# Patient Record
Sex: FEMALE | Race: BLACK OR AFRICAN AMERICAN
[De-identification: names, ages, dates, MRNs, and addresses within clinical notes are randomized per-mention and may not be internally consistent; named-entity substitution may affect disease eponyms.]

---

## 2018-01-08 ENCOUNTER — HOSPITAL ENCOUNTER (INPATIENT)
Dept: HOSPITAL 72 - EMR | Age: 78
LOS: 2 days | Discharge: NURSING, REHAB OR LONG-TERM CARE | DRG: 291 | End: 2018-01-10
Payer: COMMERCIAL

## 2018-01-08 VITALS — WEIGHT: 150 LBS | BODY MASS INDEX: 25.61 KG/M2 | HEIGHT: 64 IN

## 2018-01-08 VITALS — DIASTOLIC BLOOD PRESSURE: 61 MMHG | SYSTOLIC BLOOD PRESSURE: 153 MMHG

## 2018-01-08 VITALS — DIASTOLIC BLOOD PRESSURE: 66 MMHG | SYSTOLIC BLOOD PRESSURE: 138 MMHG

## 2018-01-08 VITALS — SYSTOLIC BLOOD PRESSURE: 143 MMHG | DIASTOLIC BLOOD PRESSURE: 51 MMHG

## 2018-01-08 DIAGNOSIS — E87.5: ICD-10-CM

## 2018-01-08 DIAGNOSIS — J96.00: ICD-10-CM

## 2018-01-08 DIAGNOSIS — E87.70: ICD-10-CM

## 2018-01-08 DIAGNOSIS — Z43.1: ICD-10-CM

## 2018-01-08 DIAGNOSIS — N18.6: ICD-10-CM

## 2018-01-08 DIAGNOSIS — D63.1: ICD-10-CM

## 2018-01-08 DIAGNOSIS — I13.2: Primary | ICD-10-CM

## 2018-01-08 DIAGNOSIS — R13.10: ICD-10-CM

## 2018-01-08 DIAGNOSIS — F03.90: ICD-10-CM

## 2018-01-08 DIAGNOSIS — Z66: ICD-10-CM

## 2018-01-08 LAB
ADD MANUAL DIFF: YES
ALBUMIN SERPL-MCNC: 3.9 G/DL (ref 3.4–5)
ALBUMIN/GLOB SERPL: 0.8 {RATIO} (ref 1–2.7)
ALP SERPL-CCNC: 162 U/L (ref 46–116)
ALT SERPL-CCNC: 80 U/L (ref 12–78)
ANION GAP SERPL CALC-SCNC: 17 MMOL/L (ref 5–15)
APPEARANCE UR: (no result)
APTT PPP: (no result) S
AST SERPL-CCNC: 41 U/L (ref 15–37)
BILIRUB SERPL-MCNC: 0.5 MG/DL (ref 0.2–1)
BUN SERPL-MCNC: 173 MG/DL (ref 7–18)
CALCIUM SERPL-MCNC: 8 MG/DL (ref 8.5–10.1)
CHLORIDE SERPL-SCNC: 95 MMOL/L (ref 98–107)
CK MB SERPL-MCNC: 6.5 NG/ML (ref 0–3.6)
CK SERPL-CCNC: 848 U/L (ref 26–308)
CO2 SERPL-SCNC: 20 MMOL/L (ref 21–32)
CREAT SERPL-MCNC: 6.8 MG/DL (ref 0.55–1.3)
ERYTHROCYTE [DISTWIDTH] IN BLOOD BY AUTOMATED COUNT: 16.6 % (ref 11.6–14.8)
GLOBULIN SER-MCNC: 5.1 G/DL
GLUCOSE UR STRIP-MCNC: NEGATIVE MG/DL
HCT VFR BLD CALC: 16.7 % (ref 37–47)
HGB BLD-MCNC: 5.6 G/DL (ref 12–16)
KETONES UR QL STRIP: NEGATIVE
LEUKOCYTE ESTERASE UR QL STRIP: (no result)
MCV RBC AUTO: 104 FL (ref 80–99)
NITRITE UR QL STRIP: NEGATIVE
PH UR STRIP: 5 [PH] (ref 4.5–8)
PLATELET # BLD: 232 K/UL (ref 150–450)
POTASSIUM SERPL-SCNC: 7 MMOL/L (ref 3.5–5.1)
PROT UR QL STRIP: (no result)
RBC # BLD AUTO: 1.62 M/UL (ref 4.2–5.4)
SODIUM SERPL-SCNC: 132 MMOL/L (ref 136–145)
SP GR UR STRIP: 1.01 (ref 1–1.03)
UROBILINOGEN UR-MCNC: NORMAL MG/DL (ref 0–1)
WBC # BLD AUTO: 12.1 K/UL (ref 4.8–10.8)

## 2018-01-08 PROCEDURE — 94664 DEMO&/EVAL PT USE INHALER: CPT

## 2018-01-08 PROCEDURE — 86710 INFLUENZA VIRUS ANTIBODY: CPT

## 2018-01-08 PROCEDURE — 82962 GLUCOSE BLOOD TEST: CPT

## 2018-01-08 PROCEDURE — 99285 EMERGENCY DEPT VISIT HI MDM: CPT

## 2018-01-08 PROCEDURE — 71045 X-RAY EXAM CHEST 1 VIEW: CPT

## 2018-01-08 PROCEDURE — 93005 ELECTROCARDIOGRAM TRACING: CPT

## 2018-01-08 PROCEDURE — 87081 CULTURE SCREEN ONLY: CPT

## 2018-01-08 PROCEDURE — 36415 COLL VENOUS BLD VENIPUNCTURE: CPT

## 2018-01-08 PROCEDURE — 82553 CREATINE MB FRACTION: CPT

## 2018-01-08 PROCEDURE — 87181 SC STD AGAR DILUTION PER AGT: CPT

## 2018-01-08 PROCEDURE — 85007 BL SMEAR W/DIFF WBC COUNT: CPT

## 2018-01-08 PROCEDURE — 82550 ASSAY OF CK (CPK): CPT

## 2018-01-08 PROCEDURE — 81003 URINALYSIS AUTO W/O SCOPE: CPT

## 2018-01-08 PROCEDURE — 85025 COMPLETE CBC W/AUTO DIFF WBC: CPT

## 2018-01-08 PROCEDURE — 83605 ASSAY OF LACTIC ACID: CPT

## 2018-01-08 PROCEDURE — 87086 URINE CULTURE/COLONY COUNT: CPT

## 2018-01-08 PROCEDURE — 83880 ASSAY OF NATRIURETIC PEPTIDE: CPT

## 2018-01-08 PROCEDURE — 84484 ASSAY OF TROPONIN QUANT: CPT

## 2018-01-08 PROCEDURE — 94760 N-INVAS EAR/PLS OXIMETRY 1: CPT

## 2018-01-08 PROCEDURE — 80053 COMPREHEN METABOLIC PANEL: CPT

## 2018-01-08 NOTE — DIAGNOSTIC IMAGING REPORT
Indication: Dyspnea

 

Comparison:  None

 

A single view chest radiograph was obtained.

 

Findings:

 

There is a focus of airspace disease within the right lung. Findings suspicious for

pneumonia although CHF is the differential. The heart is enlarged. The bones are

osteopenic. Gastrostomy noted.

 

IMPRESSION:

 

Congestive heart failure asymmetric to the right lung versus infiltrate. Please

correlate clinically

## 2018-01-08 NOTE — HISTORY AND PHYSICAL REPORT
DATE OF ADMISSION:  01/08/2018



CHIEF COMPLAINT AND REASON FOR HOSPITALIZATION:  The patient is admitted

with failure to thrive and shortness of breath.



HISTORY OF PRESENT ILLNESS:  The patient comes in from a skilled nursing

facility with POLST for No CPR and No Transfer To [facility], but

I called the facility.  Apparently, she has not yet been a member of

hospice and she presents with some respiratory failure.  She was put on

BiPAP in the emergency room.  She has chronic kidney disease and anemia

and is a [employer] and does not allow transfusions.  The patient

has severe dementia, gastrostomy feedings, and chronic kidney failure.

She now has hyperkalemia, severe anemia, and shortness of breath.  The

patient is unable to provide any further history.



PHYSICAL EXAMINATION:

GENERAL:  The patient is seen in the emergency room.  She is on BiPAP.  She

has gastrostomy feeding tube.

HEAD, EYES, EARS, NOSE, THROAT:  Sclerae are nonicteric.  She looks _____

and not following commands.  Oral mucosa moist.

NECK:  No adenopathy.

LUNGS:  Bilateral rhonchi.

HEART:  Regular rhythm.

ABDOMEN:  Soft without organomegaly.  Gastrostomy tube is in place.

EXTREMITIES:  Show severe muscle wasting.  No edema, cyanosis, or

clubbing.

NEUROLOGIC:  The patient is confused, disoriented, not following commands.

She does have mild movement in all extremities.



IMPRESSION:

1. Congestive heart failure, acute and chronic.

2. End-stage renal disease.

3. Severe anemia secondary to end-stage renal disease.

4. Directives for Do Not Resuscitate and comfort measures only.



PLAN:  We will initiate comfort measures and try to reach the family.  A

 and ECF to make a plan.  At this time, I would minimize

laboratories and any aggressive therapy with comfort care focus.  Her

records from the F have been reviewed.









  ______________________________________________

  Daniel Rivas M.D.





DR:  DERRICK

D:  01/08/2018 07:49

T:  01/08/2018 17:44

JOB#:  [id number]

CC:

## 2018-01-08 NOTE — CARDIOLOGY REPORT
--------------- APPROVED REPORT --------------





EKG Measurement

Heart [id number]

ND 

[id number]

[id number]

[id number]

[id number]





Normal sinus rhythm with sinus arrhythmia

Nonspecific ST and T wave abnormality

Prolonged QT

Abnormal ECG

## 2018-01-09 VITALS — DIASTOLIC BLOOD PRESSURE: 79 MMHG | SYSTOLIC BLOOD PRESSURE: 152 MMHG

## 2018-01-09 VITALS — SYSTOLIC BLOOD PRESSURE: 155 MMHG | DIASTOLIC BLOOD PRESSURE: 90 MMHG

## 2018-01-09 VITALS — DIASTOLIC BLOOD PRESSURE: 76 MMHG | SYSTOLIC BLOOD PRESSURE: 163 MMHG

## 2018-01-09 VITALS — SYSTOLIC BLOOD PRESSURE: 147 MMHG | DIASTOLIC BLOOD PRESSURE: 66 MMHG

## 2018-01-09 VITALS — SYSTOLIC BLOOD PRESSURE: 131 MMHG | DIASTOLIC BLOOD PRESSURE: 79 MMHG

## 2018-01-09 NOTE — GENERAL PROGRESS NOTE
Assessment/Plan


Problem List:  


(1) CHF (congestive heart failure)


ICD Codes:  I50.9 - Heart failure, unspecified


SNOMED:  71514560


(2) End-stage renal disease


ICD Codes:  N18.6 - End stage renal disease


SNOMED:  34358087


(3) Respiratory distress


ICD Codes:  R06.03 - Acute respiratory distress


SNOMED:  953159292


(4) Fluid overload


ICD Codes:  E87.70 - Fluid overload, unspecified


SNOMED:  01939473


(5) Hyperkalemia


ICD Codes:  E87.5 - Hyperkalemia


SNOMED:  64365258


(6) Respiratory failure


ICD Codes:  J96.90 - Respiratory failure, unspecified, unspecified whether with 

hypoxia or hypercapnia


SNOMED:  740151948


(7) Anemia


ICD Codes:  D64.9 - Anemia, unspecified


SNOMED:  236509052


Qualifiers:  


   


Assessment/Plan


hospice, comfort





Subjective


ROS Limited/Unobtainable:  Yes


Allergies:  


Coded Allergies:  


     No Known Allergies (Unverified , 1/8/18)





Objective





Last 24 Hour Vital Signs








  Date Time  Temp Pulse Resp B/P (MAP) Pulse Ox O2 Delivery O2 Flow Rate FiO2


 


1/9/18 12:00 97.2 96 20 155/90 94   


 


1/9/18 08:12      Non-Rebreather 15.0 100


 


1/9/18 08:12     100 Non-Rebreather 15.0 100


 


1/9/18 08:00 97.2 92 21 152/79 92   


 


1/9/18 04:00 98.0 92 18 147/66 94 Nasal Cannula 4.0 


 


1/9/18 00:00 98.1 91 18 163/76 95 Nasal Cannula 4.0 


 


1/8/18 20:00 97.2 79 20 153/61 96 Nasal Cannula 4.0 

















Intake and Output  


 


 1/8/18 1/9/18





 19:00 07:00


 


Intake Total 165 ml 340 ml


 


Balance 165 ml 340 ml


 


  


 


Intake Free Water 10 ml 120 ml


 


IV Total 55 ml 


 


Tube Feeding 100 ml 220 ml


 


# Voids 1 3








Height (Feet):  5


Height (Inches):  4.00


Weight (Pounds):  150


General Appearance:  confused, moderate distress


EENT:  normal ENT inspection


Neck:  normal alignment


Cardiovascular:  normal rate, regular rhythm


Respiratory/Chest:  rhonchi - bilaterally


Abdomen:  non tender


Edema:  moderate edema


Neurologic:  motor weakness, disoriented











SANDEEP OBANDO Jan 9, 2018 19:35

## 2018-01-10 VITALS — SYSTOLIC BLOOD PRESSURE: 152 MMHG | DIASTOLIC BLOOD PRESSURE: 71 MMHG

## 2018-01-10 VITALS — SYSTOLIC BLOOD PRESSURE: 114 MMHG | DIASTOLIC BLOOD PRESSURE: 59 MMHG

## 2018-01-10 VITALS — SYSTOLIC BLOOD PRESSURE: 132 MMHG | DIASTOLIC BLOOD PRESSURE: 61 MMHG

## 2018-01-10 VITALS — SYSTOLIC BLOOD PRESSURE: 154 MMHG | DIASTOLIC BLOOD PRESSURE: 81 MMHG

## 2018-01-10 NOTE — GENERAL PROGRESS NOTE
Assessment/Plan


Problem List:  


(1) CHF (congestive heart failure)


ICD Codes:  I50.9 - Heart failure, unspecified


SNOMED:  46562546


(2) End-stage renal disease


ICD Codes:  N18.6 - End stage renal disease


SNOMED:  29364008


(3) Respiratory distress


ICD Codes:  R06.03 - Acute respiratory distress


SNOMED:  001867983


(4) Fluid overload


ICD Codes:  E87.70 - Fluid overload, unspecified


SNOMED:  56431838


(5) Hyperkalemia


ICD Codes:  E87.5 - Hyperkalemia


SNOMED:  18575633


(6) Respiratory failure


ICD Codes:  J96.90 - Respiratory failure, unspecified, unspecified whether with 

hypoxia or hypercapnia


SNOMED:  194251218


(7) Anemia


ICD Codes:  D64.9 - Anemia, unspecified


SNOMED:  456662053


Qualifiers:  


   


Assessment/Plan


hospice, comfort





Subjective


ROS Limited/Unobtainable:  Yes


Allergies:  


Coded Allergies:  


     No Known Allergies (Unverified , 1/8/18)





Objective





Last 24 Hour Vital Signs








  Date Time  Temp Pulse Resp B/P (MAP) Pulse Ox O2 Delivery O2 Flow Rate FiO2


 


1/10/18 08:56 97.6 89 22 132/61 93   


 


1/10/18 07:21     94 Bi-pap 15.0 100


 


1/10/18 07:21      Non-Rebreather 15.0 100


 


1/10/18 04:00 97.6 92 20 154/81 98 Non-Rebreather  


 


1/10/18 00:00 97.9 91 20 152/71 90   


 


1/9/18 20:25      Non-Rebreather 15.0 100


 


1/9/18 20:25     100 Non-Rebreather 15.0 100


 


1/9/18 20:00 96.8 94 20 131/79 97   


 


1/9/18 12:00 97.2 96 20 155/90 94   

















Intake and Output  


 


 1/9/18 1/10/18





 19:00 07:00


 


Intake Total 20 ml 420 ml


 


Output Total  600 ml


 


Balance 20 ml -180 ml


 


  


 


Intake Free Water  180 ml


 


Tube Feeding 20 ml 240 ml


 


Output Urine Total  600 ml


 


# Voids 5 








Height (Feet):  5


Height (Inches):  4.00


Weight (Pounds):  150


General Appearance:  moderate distress


Neck:  normal alignment


Cardiovascular:  regular rhythm


Respiratory/Chest:  rhonchi - bilaterally


Abdomen:  non tender


Edema:  moderate edema


Neurologic:  disoriented, unresponsive, aphasia











SANDEEP OBANDO Justin 10, 2018 09:03

## 2018-01-11 NOTE — DISCHARGE SUMMARY
DATE OF ADMISSION:  01/08/2018



DATE OF DISCHARGE:  01/10/2018



PERTINENT HISTORY:  The patient presents with a DNR order with failure to

thrive from the nursing home and respiratory failure.  She presents with

abnormal labs.



PERTINENT PHYSICAL FINDINGS:

GENERAL:  The patient is in moderate respiratory distress.

LUNGS:  Bilateral rhonchi.

HEART:  Regular rhythm.

ABDOMEN:  Soft with gastrostomy.

NEUROLOGIC:  She is confused and disoriented, not following

commands.



COURSE IN THE HOSPITAL:  The patient was given comfort care as the family

wishes no aggressive care and DNR.  She was given diuretics for CHF.  Her

abnormal labs were not repeated as comfort measures were in order.  After

reaching the family, the family agreed to hospice and she was discharged

back to skilled nursing home with hospice care in poor condition with

expectation of death in the near future.



FINAL DIAGNOSES:

1. Congestive heart failure, acute on chronic.

2. End-stage renal disease.

3. Severe anemia secondary to renal failure.

4. Directives for Do Not Resuscitate.

5. Respiratory failure.

6. Hyperkalemia.

7. Diabetes.

8. Lactic acidosis.

9. Elevated troponin, likely acute myocardial infarction.



DISCHARGE DISPOSITION:  To the [facility] on hospice care.









  ______________________________________________

  Daniel Rivas M.D.





DR:  LIZBETH

D:  01/11/2018 09:09

T:  01/11/2018 22:38

JOB#:  [id number]

CC:

## 2018-01-11 NOTE — DISCHARGE SUMMARY 2 SIG
DATE OF ADMISSION:  01/08/2018



DATE OF DISCHARGE:  01/10/2018



BRIEF HOSPITAL COURSE:  The patient is a 77-year-old female, who came from

nursing home for failure to thrive and shortness of breath.  She came in

with a POLST that states no CPR and no transfer to acute hospital.  She

presented to ED in respiratory failure and has been placed on BiPAP.  She

has chronic kidney disease and anemia and has refused blood transfusions

in the past due to being [employer].  She had severe dementia and

on gastrostomy feedings.  She came in with severe anemia, hyperkalemia,

and shortness of breath.  Family was contacted.  Per family wishes, the

patient to be placed on comfort measures.  Comfort measure was initiated

and the patient was eventually discharged back to nursing home.



FINAL DIAGNOSES:

1. Acute on chronic congestive heart failure.

2. End-stage renal disease.

3. Severe anemia.

4. Do Not Resuscitate with comfort measures only/palliative care.

5. Fluid overload.

6. Acute respiratory failure.

7. Hyperkalemia.



DISCHARGE DISPOSITION:  The patient was discharged to SNF.







  ______________________________________________

  Daniel Rivas M.D.



I have been assigned to dictate discharge summary on this account and I

was not involved in the patient's management.



  ______________________________________________

  Janeen Ring N.P.





DR:  NEMO

D:  01/11/2018 13:06

T:  01/11/2018 17:10

JOB#:  [id number]

CC: